# Patient Record
Sex: FEMALE | Race: WHITE | HISPANIC OR LATINO | URBAN - METROPOLITAN AREA
[De-identification: names, ages, dates, MRNs, and addresses within clinical notes are randomized per-mention and may not be internally consistent; named-entity substitution may affect disease eponyms.]

---

## 2020-02-03 ENCOUNTER — EMERGENCY (EMERGENCY)
Facility: HOSPITAL | Age: 30
LOS: 1 days | Discharge: HOME | End: 2020-02-03
Attending: EMERGENCY MEDICINE | Admitting: EMERGENCY MEDICINE
Payer: OTHER MISCELLANEOUS

## 2020-02-03 VITALS
TEMPERATURE: 98 F | OXYGEN SATURATION: 98 % | SYSTOLIC BLOOD PRESSURE: 160 MMHG | HEIGHT: 62 IN | WEIGHT: 134.92 LBS | DIASTOLIC BLOOD PRESSURE: 70 MMHG | RESPIRATION RATE: 18 BRPM | HEART RATE: 89 BPM

## 2020-02-03 VITALS
HEART RATE: 62 BPM | DIASTOLIC BLOOD PRESSURE: 74 MMHG | RESPIRATION RATE: 17 BRPM | SYSTOLIC BLOOD PRESSURE: 111 MMHG | TEMPERATURE: 98 F | OXYGEN SATURATION: 99 %
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
## 2020-02-03 DIAGNOSIS — T22.112A BURN OF FIRST DEGREE OF LEFT FOREARM, INITIAL ENCOUNTER: ICD-10-CM
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
## 2020-02-03 DIAGNOSIS — T23.002A BURN OF UNSPECIFIED DEGREE OF LEFT HAND, UNSPECIFIED SITE, INITIAL ENCOUNTER: ICD-10-CM

## 2020-02-03 DIAGNOSIS — T23.102A BURN OF FIRST DEGREE OF LEFT HAND, UNSPECIFIED SITE, INITIAL ENCOUNTER: ICD-10-CM
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
## 2020-02-03 PROCEDURE — 16000 INITIAL TREATMENT OF BURN(S): CPT

## 2020-02-03 PROCEDURE — 99284 EMERGENCY DEPT VISIT MOD MDM: CPT | Mod: 25

## 2020-02-03 PROCEDURE — 99053 MED SERV 10PM-8AM 24 HR FAC: CPT

## 2020-02-03 RX ORDER — SODIUM CHLORIDE 9 MG/ML
1000 INJECTION INTRAMUSCULAR; INTRAVENOUS; SUBCUTANEOUS ONCE
Refills: 0 | Status: COMPLETED | OUTPATIENT
Start: 2020-02-03 | End: 2020-02-03

## 2020-02-03 RX ORDER — MORPHINE SULFATE 50 MG/1
4 CAPSULE, EXTENDED RELEASE ORAL ONCE
Refills: 0 | Status: DISCONTINUED | OUTPATIENT
Start: 2020-02-03 | End: 2020-02-03

## 2020-02-03 RX ORDER — OXYCODONE AND ACETAMINOPHEN 5; 325 MG/1; MG/1
1 TABLET ORAL ONCE
Refills: 0 | Status: DISCONTINUED | OUTPATIENT
Start: 2020-02-03 | End: 2020-02-03

## 2020-02-03 RX ORDER — KETOROLAC TROMETHAMINE 30 MG/ML
30 SYRINGE (ML) INJECTION ONCE
Refills: 0 | Status: DISCONTINUED | OUTPATIENT
Start: 2020-02-03 | End: 2020-02-03

## 2020-02-03 RX ORDER — IBUPROFEN 200 MG
1 TABLET ORAL
Qty: 20 | Refills: 0
Start: 2020-02-03

## 2020-02-03 RX ADMIN — MORPHINE SULFATE 4 MILLIGRAM(S): 50 CAPSULE, EXTENDED RELEASE ORAL at 10:26

## 2020-02-03 RX ADMIN — Medication 30 MILLIGRAM(S): at 08:47

## 2020-02-03 RX ADMIN — OXYCODONE AND ACETAMINOPHEN 1 TABLET(S): 5; 325 TABLET ORAL at 08:15

## 2020-02-03 RX ADMIN — Medication 1 APPLICATION(S): at 08:47

## 2020-02-03 RX ADMIN — SODIUM CHLORIDE 1000 MILLILITER(S): 9 INJECTION INTRAMUSCULAR; INTRAVENOUS; SUBCUTANEOUS at 08:47

## 2020-02-03 NOTE — ED ADULT NURSE NOTE - NSIMPLEMENTINTERV_GEN_ALL_ED
Implemented All Universal Safety Interventions:  Archie to call system. Call bell, personal items and telephone within reach. Instruct patient to call for assistance. Room bathroom lighting operational. Non-slip footwear when patient is off stretcher. Physically safe environment: no spills, clutter or unnecessary equipment. Stretcher in lowest position, wheels locked, appropriate side rails in place.
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
## 2020-02-03 NOTE — ED PROVIDER NOTE - NSFOLLOWUPINSTRUCTIONS_ED_ALL_ED_FT
PLEASE RETURN TO THE ER IMMEDIATELY FOR ANY REDNESS, SWELLING, SEVERE PAIN, PUS DRAINAGE, FEVER, OR ANY OTHER CONCERNS FOR INFECTION/WORSENING.    APPLY THE SILVADENE OINTMENT TWICE A DAY FOR THE NEXT WEEK.     PLEASE FOLLOW-UP AT THE [facility] TOMORROW.   THEIR CLINIC INFORMATION IS AS FOLLOWS:    [address], [place] [address]  Get Directions  Call  [phone]
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
## 2020-02-03 NOTE — ED PROVIDER NOTE - PROGRESS NOTE DETAILS
Feeling much better now.  Skin has not blistered while here in ED.  Wrm/hand dressed in silvadene.  Care instructions discussed and she will f/u with the [facility].
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
## 2020-02-03 NOTE — ED ADULT NURSE NOTE - CAS DISCH ACCOMP BY
1st attempt to reach the patient and schedule with [facility] either here in [place] or at the [facility]; lv and sent a [employer] message.    [facility]  Adult Med Spec/Surg Spec   [phone]     Self

## 2020-02-03 NOTE — ED PROVIDER NOTE - PATIENT PORTAL LINK FT
You can access the FollowMyHealth Patient Portal offered by [facility] by registering at the following website: [email or web address]. By joining [facility]’s FollowMyHealth portal, you will also be able to view your health information using other applications (apps) compatible with our system.